# Patient Record
Sex: FEMALE | Race: WHITE
[De-identification: names, ages, dates, MRNs, and addresses within clinical notes are randomized per-mention and may not be internally consistent; named-entity substitution may affect disease eponyms.]

---

## 2017-01-04 NOTE — US
Ultrasound-guided Aspiration of Abdominal Wall Fluid Collection dated January 4, 2017

 

Indication: Abdominal wall fullness and suprapubic skin induration.

 

Findings: A preprocedural ultrasound reveals a subcutaneous complex avascular collection in the midli
ne retirement between the umbilicus and pubic bone. The collection measures 10.1 cm craniocaudal x 12.4 
x 3.7 cm in the axial plane. The collection is superficial to the underlying abdominal wall musculatu
re. Deep to the indurated skin is echogenic subcutaneous fat. The collection resides superior to the 
indurated skin.

 

Consent: The procedure, risks and benefits were discussed with the patient and her . Patient a
greed to proceed and signed a consent form. All questions were addressed to my ability.

 

The findings were discussed with Dr. Slade Mccartney prior to the procedure and we agreed that I umair
uld proceed with ultrasound-guided aspiration and do not attempt to place a percutaneous drain.

 

Technique: A left-to-right lateral-to-medial approach was selected. The skin was marked, cleansed wit
h chlorhexidine, and sterilely draped. Skin and deep soft tissues were anesthetized with 1% lidocaine
. Utilizing real-time ultrasound guidance, an 18-gauge spinal needle was advanced down into the colle
ction. The needle tip was directed towards the locules and slowly aspirated with real-time visualizat
ion. The collection significantly decreased in volume over the course of the aspiration. A total of 1
80 mL of clear serosanguinous fluid was aspirated. No purulent or malodorous material was obtained.

 

Specimen: 180 mL of serosanguinous fluid. 60 mL was submitted to the Lab for Gram stain, culture, and
 sensitivity.

 

Impression: Partially evacuated anterior abdominal wall seroma. Specimen submitted to the microbiolog
y lab to rule out possible superinfection.

 

Comment: The case was discussed with Dr. Slade Mccartney following the procedure.

## 2017-04-18 NOTE — GPN
[f 
rep st]



                                                                 PROCEDURE NOTE





PREPROCEDURE DIAGNOSIS:  Poor colon prep for colonoscopy yesterday, here for 
surveillance colonoscopy for history of colon polyps.



POSTPROCEDURE DIAGNOSIS:  Colon polyps, status post removal.



PROCEDURE:  Colonoscopy with biopsy, colonoscopy with snare.



INDICATIONS:  The patient is a 71-year-old female who has a first-degree 
relative with polyps and a personal history of polyps who underwent a 
surveillance colonoscopy yesterday and had a poor prep.  She is therefore here 
for repeat colonoscopy today.  The risks and benefits of the procedure were 
discussed with the patient and consent obtained.  Risks include, but are not 
limited to, bleeding, perforation, risks associated with sedation.  Patient is 
an ASA class 2.



PROCEDURE IN DETAIL:  The adult colonoscope was advanced into the terminal 
ileum which appeared normal.  The cecum, appendiceal orifice, ileocecal valve 
appeared normal.  There was a 2 mm polyp in the ascending colon that was 
removed using cold biopsy forceps and sent off to Pathology.  The hepatic 
flexure was normal.  Transverse colon was normal.  The splenic flexure was 
normal.  The descending colon showed a 4 mm polyp which was removed using cold 
snare polypectomy and sent to Pathology.  There were a few scattered 
diverticula in the sigmoid and descending colon.  Retroflexed views in the 
rectum were normal.



IMPRESSION:  

1.  Mild left-sided diverticulosis.

2.  Two small colon polyps, status post removal.



RECOMMENDATIONS:  

1.  Discharge to home with escort.

2.  Advance diet as tolerated.

3.  Follow up final pathology results within 10 days.

4.  Repeat colonoscopy in 5 years.  I recommend this be done at the hospital 
secondary to high anesthesia requirements. 



Thank you for allowing me to participate in the care of your patient.  Please 
do not hesitate to call with questions.





Job #:  [id number]

MTDD

## 2018-06-27 ENCOUNTER — HOSPITAL ENCOUNTER (OUTPATIENT)
Dept: HOSPITAL 80 - FIMAGING | Age: 73
End: 2018-06-27
Attending: GENERAL ACUTE CARE HOSPITAL
Payer: COMMERCIAL

## 2018-06-27 DIAGNOSIS — Z12.31: Primary | ICD-10-CM

## 2018-06-27 DIAGNOSIS — Z80.3: ICD-10-CM
